# Patient Record
Sex: MALE | Race: WHITE | NOT HISPANIC OR LATINO | Employment: FULL TIME | URBAN - METROPOLITAN AREA
[De-identification: names, ages, dates, MRNs, and addresses within clinical notes are randomized per-mention and may not be internally consistent; named-entity substitution may affect disease eponyms.]

---

## 2019-02-18 ENCOUNTER — HOSPITAL ENCOUNTER (OUTPATIENT)
Dept: RADIOLOGY | Facility: HOSPITAL | Age: 19
Discharge: HOME | End: 2019-02-18
Attending: NURSE PRACTITIONER
Payer: OTHER GOVERNMENT

## 2019-02-18 ENCOUNTER — OFFICE VISIT (OUTPATIENT)
Dept: INTERNAL MEDICINE | Facility: CLINIC | Age: 19
End: 2019-02-18
Payer: OTHER GOVERNMENT

## 2019-02-18 VITALS
SYSTOLIC BLOOD PRESSURE: 118 MMHG | RESPIRATION RATE: 20 BRPM | BODY MASS INDEX: 27.75 KG/M2 | DIASTOLIC BLOOD PRESSURE: 80 MMHG | WEIGHT: 193.81 LBS | HEART RATE: 87 BPM | HEIGHT: 70 IN | OXYGEN SATURATION: 97 %

## 2019-02-18 DIAGNOSIS — M25.511 CHRONIC RIGHT SHOULDER PAIN: ICD-10-CM

## 2019-02-18 DIAGNOSIS — G89.29 CHRONIC RIGHT SHOULDER PAIN: ICD-10-CM

## 2019-02-18 DIAGNOSIS — G89.29 CHRONIC PAIN OF RIGHT KNEE: ICD-10-CM

## 2019-02-18 DIAGNOSIS — M25.561 CHRONIC PAIN OF RIGHT KNEE: ICD-10-CM

## 2019-02-18 DIAGNOSIS — M25.561 CHRONIC PAIN OF RIGHT KNEE: Primary | ICD-10-CM

## 2019-02-18 DIAGNOSIS — G89.29 CHRONIC PAIN OF RIGHT KNEE: Primary | ICD-10-CM

## 2019-02-18 PROCEDURE — 99999 PR PBB SHADOW E&M-NEW PATIENT-LVL IV: CPT | Mod: PBBFAC,,, | Performed by: NURSE PRACTITIONER

## 2019-02-18 PROCEDURE — 99204 OFFICE O/P NEW MOD 45 MIN: CPT | Mod: PBBFAC,PN,25 | Performed by: NURSE PRACTITIONER

## 2019-02-18 PROCEDURE — 73030 X-RAY EXAM OF SHOULDER: CPT | Mod: TC,RT

## 2019-02-18 PROCEDURE — 99204 OFFICE O/P NEW MOD 45 MIN: CPT | Mod: S$PBB,,, | Performed by: NURSE PRACTITIONER

## 2019-02-18 PROCEDURE — 73560 X-RAY EXAM OF KNEE 1 OR 2: CPT | Mod: TC,RT

## 2019-02-18 PROCEDURE — 99204 PR OFFICE/OUTPT VISIT, NEW, LEVL IV, 45-59 MIN: ICD-10-PCS | Mod: S$PBB,,, | Performed by: NURSE PRACTITIONER

## 2019-02-18 PROCEDURE — 99999 PR PBB SHADOW E&M-NEW PATIENT-LVL IV: ICD-10-PCS | Mod: PBBFAC,,, | Performed by: NURSE PRACTITIONER

## 2019-02-18 RX ORDER — IBUPROFEN 800 MG/1
800 TABLET ORAL 3 TIMES DAILY PRN
Qty: 90 TABLET | Refills: 0 | Status: SHIPPED | OUTPATIENT
Start: 2019-02-18 | End: 2021-04-14

## 2019-02-18 NOTE — PROGRESS NOTES
Subjective:       Patient ID: Hari Gillespie is a 18 y.o. male.    Chief Complaint: Establish Care; Knee Pain (both knees- x 1 year but getting worse in the last 2 weeks PS:8); and Shoulder Pain (R. shoulder - PS:2 more pain with movement and getting worse x over a year )    Patient is unknown, to me and presents with   Chief Complaint   Patient presents with    Establish Care    Knee Pain     both knees- x 1 year but getting worse in the last 2 weeks PS:8    Shoulder Pain     R. shoulder - PS:2 more pain with movement and getting worse x over a year    .  Denies chest pain and shortness of breath.  Patient presents with right knee pain that began about two years ago. Has been running and working out with pain. Pain is up to an 8 and also with shoulder. States that he works on [employer] and does a lot of heavy lifting  HPI  Review of Systems   Constitutional: Negative.  Negative for activity change, appetite change, chills, diaphoresis, fatigue, fever and unexpected weight change.   HENT: Negative.  Negative for congestion, ear discharge, ear pain, facial swelling, hearing loss, nosebleeds, postnasal drip, rhinorrhea, sinus pressure, sneezing, sore throat, tinnitus, trouble swallowing and voice change.    Eyes: Negative.  Negative for photophobia, pain, discharge, redness, itching and visual disturbance.   Respiratory: Negative.  Negative for apnea, cough, choking, chest tightness, shortness of breath, wheezing and stridor.    Cardiovascular: Negative.  Negative for chest pain, palpitations and leg swelling.   Gastrointestinal: Negative for abdominal distention, abdominal pain, anal bleeding, blood in stool, constipation, diarrhea, nausea and vomiting.   Genitourinary: Negative.  Negative for difficulty urinating, discharge, dysuria, enuresis, flank pain, frequency, hematuria, penile pain, penile swelling, scrotal swelling, testicular pain and urgency.   Musculoskeletal: Positive for arthralgias. Negative for  back pain, gait problem, joint swelling, myalgias, neck pain and neck stiffness.   Skin: Negative.  Negative for color change, pallor, rash and wound.   Neurological: Negative for dizziness, tremors, seizures, syncope, facial asymmetry, speech difficulty, weakness, light-headedness, numbness and headaches.   Hematological: Negative for adenopathy. Does not bruise/bleed easily.   Psychiatric/Behavioral: Negative.  Negative for agitation, dysphoric mood, sleep disturbance and suicidal ideas. The patient is not nervous/anxious.        Objective:      Physical Exam   Constitutional: He is oriented to person, place, and time. He appears well-developed and well-nourished. No distress.   HENT:   Head: Normocephalic and atraumatic.   Right Ear: External ear normal.   Left Ear: External ear normal.   Nose: Nose normal.   Mouth/Throat: Oropharynx is clear and moist. No oropharyngeal exudate.   Eyes: Conjunctivae and EOM are normal. Pupils are equal, round, and reactive to light. Right eye exhibits no discharge. Left eye exhibits no discharge.   Neck: Normal range of motion. Neck supple. No JVD present. No tracheal deviation present. No thyromegaly present.   Cardiovascular: Normal rate, regular rhythm, normal heart sounds and intact distal pulses. Exam reveals no gallop and no friction rub.   No murmur heard.  Pulmonary/Chest: Effort normal and breath sounds normal. No stridor. No respiratory distress. He has no wheezes. He has no rales. He exhibits no tenderness.   Abdominal: Soft. Bowel sounds are normal. He exhibits no distension. There is no tenderness. There is no rebound and no guarding.   Musculoskeletal: He exhibits no edema.        Right shoulder: He exhibits decreased range of motion, tenderness and crepitus.        Right knee: He exhibits decreased range of motion. Tenderness found.        Arms:       Legs:  Lymphadenopathy:     He has no cervical adenopathy.   Neurological: He is alert and oriented to person,  "place, and time. He has normal reflexes. He displays normal reflexes. No cranial nerve deficit. He exhibits normal muscle tone. Coordination normal.   Skin: Skin is warm and dry. Capillary refill takes less than 2 seconds. No rash noted. He is not diaphoretic. No erythema. No pallor.   Psychiatric: He has a normal mood and affect. His behavior is normal. Judgment and thought content normal.   Nursing note and vitals reviewed.      Assessment:       1. Chronic pain of right knee    2. Chronic right shoulder pain        Plan:   Hari was seen today for establish care, knee pain and shoulder pain.    Diagnoses and all orders for this visit:    Chronic pain of right knee  -     X-Ray Knee 1 or 2 View Right; Future  -     ibuprofen (ADVIL,MOTRIN) 800 MG tablet; Take 1 tablet (800 mg total) by mouth 3 (three) times daily as needed for Pain.    Chronic right shoulder pain  -     X-ray Shoulder 2 or More Views Right; Future  -     ibuprofen (ADVIL,MOTRIN) 800 MG tablet; Take 1 tablet (800 mg total) by mouth 3 (three) times daily as needed for Pain.    "This note will not be shared with the patient."  Pending reports may need ortho referral  Instructed on RICE  Will call with results  rtc as scheduled  "

## 2019-02-18 NOTE — PATIENT INSTRUCTIONS
Chronic Pain  Pain serves an important role. It lets you know something is wrong that needs your attention. When the body heals, pain normally goes away.  When pain lasts longer than six months, it is called chronic pain. This is pain that is present even after the body has healed. Chronic pain can cause mood problems and get in the way of your relationships and your daily life.  A number of conditions can cause chronic pain. Some of the more common include:  · Previous surgery  · An old injury  · Infection  · Diseases such as diabetes  · Nerve damage  · Back injury  · Arthritis  · Migraine or other headaches  · Fibromyalgia  · Cancer  Depression and stress can make chronic pain symptoms worse. In some cases, a cause for the pain cannot be found.   Treatment  Treatment can greatly reduce pain. In many cases, pain can become less severe, occur less often, and interfere less with your daily life. Chronic pain is often treated with a combination of medicines, therapies, and lifestyle changes. You will work closely with your healthcare provider to find a treatment plan that works best for you.  · Ask your healthcare provider for a referral to a pain management specialty center. These can provide the most recent and proven pain management strategies, along with emotional support and comprehensive services.  · Several different types of medicines may be prescribed for chronic pain. Work with your healthcare provider to develop a medicine plan that helps manage your pain.  · Physical therapy can be very effective in helping reduce certain types of chronic pain.  · Occupational therapy teaches you how to do routine tasks of daily living in ways that lessen your discomfort.  · Psychological therapy can help you cope better with stress and pain.  · Other therapies such as meditation, yoga, biofeedback, massage, and acupuncture can also help manage chronic pain.  · Changing certain habits can help reduce chronic pain. They  include:  ¨ Eating healthy  ¨ Developing an exercise routine  ¨ Getting enough sleep at night  ¨ Stopping smoking and limiting alcohol use  ¨ Losing excess weight  Follow-up care  Follow up with your healthcare provider as advised. Let your healthcare provider know if your current treatment plan is working or if changes are needed.  Resources  For more information, contact:  · American [place] for Headache Society www.achenet.org  · American Chronic Pain Association www.theacpa.org 138-836-9569  Date Last Reviewed: 7/26/2015 © 2000-2017 [employer]. [address] [place] [address]. All rights reserved. This information is not intended as a substitute for professional medical care. Always follow your healthcare professional's instructions.

## 2019-02-21 ENCOUNTER — TELEPHONE (OUTPATIENT)
Dept: INTERNAL MEDICINE | Facility: CLINIC | Age: 19
End: 2019-02-21

## 2019-02-21 DIAGNOSIS — M25.561 CHRONIC PAIN OF RIGHT KNEE: Primary | ICD-10-CM

## 2019-02-21 DIAGNOSIS — G89.29 CHRONIC PAIN OF RIGHT KNEE: Primary | ICD-10-CM

## 2019-03-15 ENCOUNTER — OFFICE VISIT (OUTPATIENT)
Dept: INTERNAL MEDICINE | Facility: CLINIC | Age: 19
End: 2019-03-15
Payer: OTHER GOVERNMENT

## 2019-03-15 VITALS
BODY MASS INDEX: 26.77 KG/M2 | HEART RATE: 86 BPM | HEIGHT: 70 IN | WEIGHT: 187 LBS | RESPIRATION RATE: 16 BRPM | OXYGEN SATURATION: 97 % | SYSTOLIC BLOOD PRESSURE: 108 MMHG | DIASTOLIC BLOOD PRESSURE: 70 MMHG

## 2019-03-15 DIAGNOSIS — M25.561 CHRONIC PAIN OF RIGHT KNEE: Primary | ICD-10-CM

## 2019-03-15 DIAGNOSIS — G89.29 CHRONIC PAIN OF RIGHT KNEE: Primary | ICD-10-CM

## 2019-03-15 PROCEDURE — 99213 OFFICE O/P EST LOW 20 MIN: CPT | Mod: S$PBB,,, | Performed by: NURSE PRACTITIONER

## 2019-03-15 PROCEDURE — 99999 PR PBB SHADOW E&M-EST. PATIENT-LVL IV: CPT | Mod: PBBFAC,,, | Performed by: NURSE PRACTITIONER

## 2019-03-15 PROCEDURE — 99999 PR PBB SHADOW E&M-EST. PATIENT-LVL IV: ICD-10-PCS | Mod: PBBFAC,,, | Performed by: NURSE PRACTITIONER

## 2019-03-15 PROCEDURE — 99214 OFFICE O/P EST MOD 30 MIN: CPT | Mod: PBBFAC,PN | Performed by: NURSE PRACTITIONER

## 2019-03-15 PROCEDURE — 99213 PR OFFICE/OUTPT VISIT, EST, LEVL III, 20-29 MIN: ICD-10-PCS | Mod: S$PBB,,, | Performed by: NURSE PRACTITIONER

## 2019-03-15 NOTE — PROGRESS NOTES
"Subjective:       Patient ID: Hari Gillespie is a 18 y.o. male.    Chief Complaint: Knee Pain (R. knee - been an ongoing issue getting worse PS:8)    Patient is known, to me and presents with   Chief Complaint   Patient presents with    Knee Pain     R. knee - been an ongoing issue getting worse PS:8   .  Denies chest pain and shortness of breath.  Patient presents with worsening of right leg knee pain that is not improving.  He reports that this is affecting his work at this time. Did not go to PT  HPI  Review of Systems   Constitutional: Negative.    Respiratory: Negative.    Cardiovascular: Negative.    Musculoskeletal: Positive for arthralgias. Negative for back pain, gait problem, joint swelling, myalgias, neck pain and neck stiffness.   Skin: Negative.        Objective:      Physical Exam   Constitutional: He appears well-developed and well-nourished. No distress.   Neck: Normal range of motion. Neck supple. No JVD present. No tracheal deviation present. No thyromegaly present.   Cardiovascular: Normal rate, regular rhythm and normal heart sounds.   No murmur heard.  Pulmonary/Chest: Effort normal and breath sounds normal. He has no wheezes.   Musculoskeletal: He exhibits tenderness. He exhibits no edema or deformity.        Right knee: He exhibits decreased range of motion. Tenderness found.        Legs:  Lymphadenopathy:     He has no cervical adenopathy.   Skin: Skin is warm and dry. Capillary refill takes less than 2 seconds. No rash noted. He is not diaphoretic. No erythema. No pallor.       Assessment:       1. Chronic pain of right knee        Plan:   Hari was seen today for knee pain.    Diagnoses and all orders for this visit:    Chronic pain of right knee  -     Ambulatory Referral to Orthopedics    "This note will not be shared with the patient."  Will send to ortho for further evaluation  Continue with [place]   rtc as scheduled    "

## 2019-03-15 NOTE — PATIENT INSTRUCTIONS
Chronic Pain  Pain serves an important role. It lets you know something is wrong that needs your attention. When the body heals, pain normally goes away.  When pain lasts longer than six months, it is called chronic pain. This is pain that is present even after the body has healed. Chronic pain can cause mood problems and get in the way of your relationships and your daily life.  A number of conditions can cause chronic pain. Some of the more common include:  · Previous surgery  · An old injury  · Infection  · Diseases such as diabetes  · Nerve damage  · Back injury  · Arthritis  · Migraine or other headaches  · Fibromyalgia  · Cancer  Depression and stress can make chronic pain symptoms worse. In some cases, a cause for the pain cannot be found.   Treatment  Treatment can greatly reduce pain. In many cases, pain can become less severe, occur less often, and interfere less with your daily life. Chronic pain is often treated with a combination of medicines, therapies, and lifestyle changes. You will work closely with your healthcare provider to find a treatment plan that works best for you.  · Ask your healthcare provider for a referral to a pain management specialty center. These can provide the most recent and proven pain management strategies, along with emotional support and comprehensive services.  · Several different types of medicines may be prescribed for chronic pain. Work with your healthcare provider to develop a medicine plan that helps manage your pain.  · Physical therapy can be very effective in helping reduce certain types of chronic pain.  · Occupational therapy teaches you how to do routine tasks of daily living in ways that lessen your discomfort.  · Psychological therapy can help you cope better with stress and pain.  · Other therapies such as meditation, yoga, biofeedback, massage, and acupuncture can also help manage chronic pain.  · Changing certain habits can help reduce chronic pain. They  include:  ¨ Eating healthy  ¨ Developing an exercise routine  ¨ Getting enough sleep at night  ¨ Stopping smoking and limiting alcohol use  ¨ Losing excess weight  Follow-up care  Follow up with your healthcare provider as advised. Let your healthcare provider know if your current treatment plan is working or if changes are needed.  Resources  For more information, contact:  · American [place] for Headache Society www.achenet.org  · American Chronic Pain Association www.theacpa.org 291-710-9193  Date Last Reviewed: 7/26/2015 © 2000-2017 [employer]. [address] [place] [address]. All rights reserved. This information is not intended as a substitute for professional medical care. Always follow your healthcare professional's instructions.        Knee Pain  Knee pain is very common. Its especially common in active people who put a lot of pressure on their knees, like runners. It affects women more often than men.  Your kneecap (patella) is a thick, round bone. It covers and protects the front portion of your knee joint. It moves along a groove in your thighbone (femur) as part of the patellofemoral joint. A layer of cartilage surrounds the underside of your kneecap. This layer protects it from grinding against your femur.  When this cartilage softens and breaks down, it can cause knee pain. This is partly because of repetitive stress. The stress irritates the lining of the joint. This causes pain in the underlying bone.  What causes knee pain?  Many things can cause knee pain. You may have more than one cause. Some of these include:  · Overuse of the knee joint  · The kneecap doesnt line up with the tissue around it  · Damage to small nerves in the area  · Damage to the ligament-like structure that holds the kneecap in place (retinaculum)  · Breakdown of the bone under the cartilage  · Swelling in the soft tissues around the kneecap  · Injury  You might be more likely to have knee pain if  you:  · Exercise a lot  · Recently increased the intensity of your workouts  · Have a body mass index (BMI) greater than 25  · Have poor alignment of your kneecap  · Walk with your feet turned overly outward or inward  · Have weakness in surrounding muscle groups (inner quad or hip adductor muscles)  · Have too much tightness in surrounding muscle groups (hamstrings or iliotibial band)  · Have a recent history of injury to the area  · Are female  Symptoms of knee pain  This type of knee pain is a dull, aching pain in the front of the knee in the area under and around the kneecap. This pain may start quickly or slowly. Your pain might be worse when you squat, run, or sit for a long time. You might also sometimes feel like your knee is giving out. You may have symptoms in one or both of your knees.  Diagnosing knee pain  Your healthcare provider will ask about your medical history and your symptoms. Be sure to describe any activities that make your knee pain worse. He or she will look at your knee. This will include tests of your range of motion, strength, and areas of pain of your knee. Your knee alignment will be checked.  Your healthcare provider will need to rule out other causes of your knee pain, such as arthritis. You may need an imaging test, such as an X-ray or MRI.  Treatment for knee pain  Treatments that can help ease your symptoms may include:  · Avoiding activities for a while that make your pain worse, returning to activity over time  · Icing the outside of your knee when it causes you pain  · Taking over-the-counter pain medicine  · Wearing a knee brace or taping your knee to support it  · Wearing special shoe inserts to help keep your feet in the proper alignment  · Doing special exercises to stretch and strengthen the muscles around your hip and your knee  These steps help most people manage knee pain. But some cases of knee pain need to be treated with surgery. You may need surgery right away. Or  you may need it later if other treatments dont work. Your healthcare provider may refer you to an orthopedic surgeon. He or she will talk with you about your choices.  Preventing knee pain  Losing weight and correcting excess muscle tightness or muscle weakness may help lower your risk.  In some cases, you can prevent knee pain. To help prevent a flare-up of knee pain, you do these things:  · Regularly do all the exercises your doctor or physical therapist advises  · Support your knee as advised by your doctor or physical therapist  · Increase training gradually, and ease up on training when needed  · Have an expert check your gait for running or other sporting activities  · Stretch properly before and after exercise  · Replace your running shoes regularly  · Lose excess weight     When to call your healthcare provider  Call your healthcare provider right away if:  · Your symptoms dont get better after a few weeks of treatment  · You have any new symptoms   Date Last Reviewed: 4/1/2017  © 7493-3229 [employer]. [address] [place] [address], [place] [address]. All rights reserved. This information is not intended as a substitute for professional medical care. Always follow your healthcare professional's instructions.

## 2019-04-02 ENCOUNTER — OFFICE VISIT (OUTPATIENT)
Dept: ORTHOPEDICS | Facility: CLINIC | Age: 19
End: 2019-04-02
Payer: OTHER GOVERNMENT

## 2019-04-02 VITALS — WEIGHT: 187 LBS | BODY MASS INDEX: 26.77 KG/M2 | HEIGHT: 70 IN

## 2019-04-02 DIAGNOSIS — M22.2X9 PATELLOFEMORAL STRESS SYNDROME, UNSPECIFIED LATERALITY: Primary | ICD-10-CM

## 2019-04-02 PROCEDURE — 99202 OFFICE O/P NEW SF 15 MIN: CPT | Mod: S$PBB,,, | Performed by: ORTHOPAEDIC SURGERY

## 2019-04-02 PROCEDURE — 99999 PR PBB SHADOW E&M-EST. PATIENT-LVL III: CPT | Mod: PBBFAC,,, | Performed by: ORTHOPAEDIC SURGERY

## 2019-04-02 PROCEDURE — 99999 PR PBB SHADOW E&M-EST. PATIENT-LVL III: ICD-10-PCS | Mod: PBBFAC,,, | Performed by: ORTHOPAEDIC SURGERY

## 2019-04-02 PROCEDURE — 99213 OFFICE O/P EST LOW 20 MIN: CPT | Mod: PBBFAC,PN | Performed by: ORTHOPAEDIC SURGERY

## 2019-04-02 PROCEDURE — 99202 PR OFFICE/OUTPT VISIT, NEW, LEVL II, 15-29 MIN: ICD-10-PCS | Mod: S$PBB,,, | Performed by: ORTHOPAEDIC SURGERY

## 2019-04-02 NOTE — LETTER
April 2, 2019      Crystal Atkinson, HEATHER  [address]  [place] [address]           [facility]  [address]  [address]  [facility] [address]  Phone: [phone]  Fax: [phone]          Patient: Hari Gillespie   MR Number: [id number]   YOB: 2000   Date of Visit: 4/2/2019       Dear Crystal Atkinson:    Thank you for referring Hari Gillespie to me for evaluation. Attached you will find relevant portions of my assessment and plan of care.    If you have questions, please do not hesitate to call me. I look forward to following Hari Gillespie along with you.    Sincerely,    Kyle Herrera MD    Enclosure  CC:  No Recipients    If you would like to receive this communication electronically, please contact [email or web address] or [phone] to request more information on [facility] Link access.    For providers and/or their staff who would like to refer a patient to [facility], please contact us through our one-stop-shop provider referral line, [facility], at [phone].    If you feel you have received this communication in error or would no longer like to receive these types of communications, please e-mail [email or web address]

## 2019-04-02 NOTE — PROGRESS NOTES
Subjective:      Patient ID: Hari Gillespie is a 18 y.o. male.    Chief Complaint: Pain of the Right Knee    HPI     They have experienced problems with their right knee over the past 6 weeks. The patient reports relevant history of injury/aggravation.Possibly from running. Pain is located diffusely Associated symptoms include NA.  Symptoms are aggravated by riding on [employer] boat. They have been treated with NSAIDS and bracing.   Symptoms have recently stayed the same. Ambulation reportedly has not been impaired. Self care ADLs are not painful.     Review of Systems   Constitution: Negative for fever and weight loss.   HENT: Negative for congestion.    Eyes: Negative for visual disturbance.   Cardiovascular: Negative for chest pain.   Respiratory: Negative for shortness of breath.    Hematologic/Lymphatic: Negative for bleeding problem. Does not bruise/bleed easily.   Skin: Negative for poor wound healing.   Musculoskeletal: Positive for joint pain.   Gastrointestinal: Negative for abdominal pain.   Genitourinary: Negative for dysuria.   Neurological: Negative for seizures.   Psychiatric/Behavioral: Negative for altered mental status.   Allergic/Immunologic: Negative for persistent infections.         Objective:            Ortho/SPM Exam    Right knee    The patient is not in acute distress.   Body habitus is normal.   No respiratory distress  Sclera appear normal  The patient walks without a limp.  Resisted SLR negative.   The skin over the knee is intact.  Knee effusion none   Tendernes is located absent  Range of motion- Full  Ligament exam:   MCL intact   Lachman intact              Post sag intact    LCL intact  Patellar apprehension negative.  Popliteal cyst negative  Patellar crepitation absent.  Flexion/pinch negative.  Pulses DP present, PT present.  Motor normal 5/5 strength in all tested muscle groups.   Sensory normal  Xray normal      Assessment:       Encounter Diagnosis   Name Primary?     Patellofemoral stress syndrome, unspecified laterality Yes          Plan:       Hari was seen today for pain.    Diagnoses and all orders for this visit:    Patellofemoral stress syndrome, unspecified laterality      I explained my impression in detail    Cont ibu    No running or squats    Light duty    PTx    Sports referral if not better

## 2019-04-16 ENCOUNTER — CLINICAL SUPPORT (OUTPATIENT)
Dept: REHABILITATION | Facility: HOSPITAL | Age: 19
End: 2019-04-16
Attending: ORTHOPAEDIC SURGERY
Payer: OTHER GOVERNMENT

## 2019-04-16 DIAGNOSIS — R26.2 DIFFICULTY WALKING: ICD-10-CM

## 2019-04-16 DIAGNOSIS — M22.2X1 PATELLOFEMORAL SYNDROME, RIGHT: Primary | ICD-10-CM

## 2019-04-16 DIAGNOSIS — S76.311S HAMSTRING STRAIN, RIGHT, SEQUELA: Chronic | ICD-10-CM

## 2019-04-16 DIAGNOSIS — R29.898 WEAKNESS OF EXTREMITY: ICD-10-CM

## 2019-04-16 PROCEDURE — 97110 THERAPEUTIC EXERCISES: CPT | Mod: PO

## 2019-04-16 PROCEDURE — 97162 PT EVAL MOD COMPLEX 30 MIN: CPT | Mod: PO

## 2019-04-16 NOTE — PLAN OF CARE
[facility]  Physical Therapy Initial Evaluation    Name: Hari Gillespie  Clinic Number: [id number]    Therapy Diagnosis:   Encounter Diagnoses   Name Primary?    Patellofemoral syndrome, right     Difficulty walking     Weakness of extremity      Physician: Kyle Herrera MD    Physician Orders: PT Eval and Treat   Medical Diagnosis from Referral: patellofemoral stress syndrome  Evaluation Date: 4/16/2019  Authorization Period Expiration: 8/8/19  Plan of Care Expiration: 7/16/19  Visit # / Visits authorized: 1/ 1    Time In: 11  Time Out: 12  Total Billable Time: 55 minutes    Precautions: Standard    Subjective   Date of onset: 2/2019  History of current condition - Hari reports: chronic hx of right knee pain with recent exacerbation while running in February. Pt. noted diffuse right knee pain. Pt. states that he had done some burpies, jumping jacks, and other calisthetics long-term through the run. The pain started immediately after the calisthetics. Pt. reports that he used to exercise too much: cross fit, swimming, etc. that first caused the right knee pain to start. Current symptoms: dull ache pretty-patella with occasional sharp pain (fleeting pain). Sometimes the sharp pain occurs with transitional activities I.e. movement from flexion to straightening.      Medical History:   No past medical history on file.    Surgical History:   Hari Gillespie  has no past surgical history on file.    Medications:   Hari has a current medication list which includes the following prescription(s): ibuprofen.    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; works 2 days on and 5 days off; duty swaps one day per  week  Prior Level of Function: run 2x/wk, 2x/wk: organized workout: run [employer] for 30 min., and then run back; basketball/football  Current Level of Function: work related activities only:  duties only    Pain:  Current 3/10, worst 8/10, best 1/10   Location: right knee   Description: Aching, Dull and Sharp  Aggravating Factors: Sitting, Standing, transitional activities; bending knee, Walking, stair negotiation: ascending/descending; jumping from boat to the pier and vice versa (2 ft), FWB on right knee; and work outs: physical activities  Easing Factors: ice, rest, elevation and brace/ibprofen    Pts goals: return to full job related activities    Objective     Observation: right foot eversion    Posture: kyphotic posturing    Gait: unremarkable     Active/Passive ROM: (measured in degrees)   Knee    Right WNL   Left WNL     Sensation: Intact bilateral LEs    Lower Extremity Strength (graded 0-5 out of 5)    Right LE Left LE   Hip flexion: 4+/5 5/5   Hip ER: 4+/5; medial posterior knee and VMO pain 5/5   Hip IR: 4/5 4/5   Knee extension:  4+/5 4+/5   Ankle dorsiflexion: 5/5 5/5   Posterior fibers of Gluteus Medius 4-/5 3-/5   Hip extension: 4+/5 4+/5   Knee flexion:  4+/5; medial hamstrings * 5/5   Glute max 4+/5 4+/5   Ankle plantarflexion: 5/5 5/5     Special Tests: (pos. or neg.)    (R)   Thessaly's Test medial hamstrings instability   Apley's Compression + IR of tibia has lateral knee pain   Apley's Distraction    -   Valgus/varus -   Valgus/varus at 30 deg. -   Bridge test + weakness of hip and core     Function:   Double Leg squat: quad. dominance bilaterally; genu valgum right greater than left; core weakness   SL squat: quad. dominance; right ankle EV, genu valgum bilaterally right greater than left; core weakness   SLS R: 20 sec.   SLS L: 45 sec.   Repeated heel raises: right: 20x with posterior knee/calf pain; left: 20x    Joint Mobility: (grading 0-6 out of 6)     RLE LLE    Superior glide of patella on femur 3- 3   Inferior patella glide 3- 3   Lateral patella glide 3- 3   Medial patella glide 3- 3     Palpation: TTP right pretty-patella and medial hamstrings (muscle belly and insertion)    Flexibility:   90/90 HS length: R = 50 degrees medial hamstrings straining pain; L = 60 degrees   Franko test: bilateral lateral retinaculum and rectus femoris    CMS Impairment/Limitation/Restriction for FOTO knee Survey    Therapist reviewed FOTO scores for Hari Gillespie on 4/16/2019.   FOTO documents entered into [employer] - see Media section.    Limitation Score: 57%       TREATMENT   Treatment Time In: 11:50  Treatment Time Out: 12  Total Treatment time separate from Evaluation: 10 minutes    Hari received therapeutic exercises to develop strength, ROM and flexibility for 10 minutes including:  foam roller quadriceps and ITB; prone quadriceps and long sit hamstrings stretch; and quad. set with towel under knee    Home Exercises and Patient Education Provided    Education provided:   - activity avoidance/modification, prognosis, diagnosis, and plan    Written Home Exercises Provided: Patient instructed to cont prior HEP.  Exercises were reviewed and Hari was able to demonstrate them prior to the end of the session.  Hari demonstrated good  understanding of the education provided.     See EMR under Patient Instructions for exercises provided 4/16/2019.    Assessment   Hari is a 18 y.o. male referred to outpatient Physical Therapy with a medical diagnosis of patellofemoral stress syndrome. Pt presents with gluteus/fernandez/minimus weakness, decreased hamstrings length, myofascial restriction right medial hamstring, and    Pt prognosis is Excellent.   Pt will benefit from skilled outpatient Physical Therapy to address the deficits stated above and in the chart below, provide pt/family education, and to maximize pt's level of independence.     Plan of care discussed with patient: Yes  Pt's spiritual,  cultural and educational needs considered and patient is agreeable to the plan of care and goals as stated below:     Anticipated Barriers for therapy: chronicity of condition    Medical Necessity is demonstrated by the following  History  Co-morbidities and personal factors that may impact the plan of care Co-morbidities:   none    Personal Factors:   chronicity of condition     moderate   Examination  Body Structures and Functions, activity limitations and participation restrictions that may impact the plan of care Body Regions:   lower extremities    Body Systems:    gross symmetry  ROM  strength  balance  gait  transfers  transitions    Participation Restrictions:   leisure workout and calisthenics for exercise endurance with job related activities     Activity limitations:   Learning and applying knowledge  no deficits    General Tasks and Commands  no deficits    Communication  no deficits    Mobility  walking    Self care  no deficits    Domestic Life  no deficits    Interactions/Relationships  no deficits    Life Areas  no deficits    Community and Social Life  no deficits         moderate   Clinical Presentation evolving clinical presentation with changing clinical characteristics moderate   Decision Making/ Complexity Score: moderate     GOALS:   Short Term Goals:  6 weeks  1. Pt. to report decreased right knee pain  </= 5/10 at worst to increase tolerance for transition activities  2. Pt. to demonstrate increased MMT for left gluteus medius to 3/5 to increase hip stability during ambulation on uneven ground.  3. Pt to report independence with symptom management  4. Pt to tolerate HEP to improve ROM and independence with ADL's    Long Term Goals: 12 weeks  1. Pt. to report decreased right knee pain  </= 2/10 at worst to increase tolerance for prolonged positioning/job related activities  2. Pt. to be able to perform required work calisthenics with minimal to no c/o increased pain.  3. Pt. to demonstrate  increased MMT for bilateral gluteus medius/fernandez/medius to 4+/5 to increase hip stability during ambulation/running.  4. Pt. to demonstrate increased MMT for bilateral hamstrings to 5/5 to increase ease with knee flexion/toe clearance during ambulation/running  5. Pt will report at CJ level (20-40% impaired) on LE FOTO survey to demonstrate increase in LE function and mobility in home and community environment.   6. Pt to be independent with HEP to improve ROM and independence with ADL's    Plan   Plan of care Certification: 4/16/2019 to 7/16/19.    Outpatient Physical Therapy 2 times weekly for 10 weeks to include the following interventions: Manual Therapy, Neuromuscular Re-ed, Patient Education and Therapeutic Exercise.     Sun Navarro, PT

## 2019-04-24 ENCOUNTER — CLINICAL SUPPORT (OUTPATIENT)
Dept: REHABILITATION | Facility: HOSPITAL | Age: 19
End: 2019-04-24
Attending: ORTHOPAEDIC SURGERY
Payer: OTHER GOVERNMENT

## 2019-04-24 DIAGNOSIS — R29.898 WEAKNESS OF EXTREMITY: ICD-10-CM

## 2019-04-24 DIAGNOSIS — M22.2X1 PATELLOFEMORAL SYNDROME, RIGHT: Primary | ICD-10-CM

## 2019-04-24 DIAGNOSIS — S76.311S HAMSTRING STRAIN, RIGHT, SEQUELA: ICD-10-CM

## 2019-04-24 DIAGNOSIS — R26.2 DIFFICULTY WALKING: ICD-10-CM

## 2019-04-24 PROCEDURE — 97110 THERAPEUTIC EXERCISES: CPT | Mod: PO

## 2019-04-24 NOTE — PROGRESS NOTES
"  Physical Therapy Daily Treatment Note     Name: Hari Gillespie  Clinic Number: [id number]    Therapy Diagnosis:   Encounter Diagnoses   Name Primary?    Patellofemoral syndrome, right Yes    Weakness of extremity     Difficulty walking     Hamstring strain, right, sequela      Physician: Kyle Herrera MD    Visit Date: 4/24/2019    Physician Orders: PT Eval and Treat   Medical Diagnosis from Referral: patellofemoral stress syndrome  Evaluation Date: 4/16/2019  Authorization Period Expiration: 8/8/19  Plan of Care Expiration: 7/16/19  Visit # / Visits authorized: 2/ 16    Time In: 3pm  Time Out: 5pm  Total Billable Time: 30 minutes    Precautions: Standard    Subjective     Pt reports: he is doing pretty well. Notes soreness with HEP, but a "good" soreness.  He was compliant with home exercise program.  Response to previous treatment: some reduction in pain  Functional change: none as of yet    Pain: 2/10  Location: right knee: ITB and hamstrings    Objective     Hari received therapeutic exercises to develop strength, endurance, ROM, flexibility, posture and core stabilization for 30 minutes including:  Warm-up  · upright bike: L1 5'  Prone:  Supine:   · Long sit TKE stretch with belt: 3x30 sec.  · HS runner's stretch: 3x30 sec.  · QS with heel prop: 10x10 sec.   · bridge with isometric hip abduction with OTB: 2x10  Sidelying:   · s/l clams with 1# at knee: 2x10  Sitting:    Standing:  · soleus stretch in lunge position: 3x30 sec.  · SLS: 3x30 sec.   Machines  · shuttle double leg squat: 2 black bands: 2x8  · shuttle up on two down on one with 2 black bands: 2x8  · sidelying single squat: 1.5 bands: 2x8 bilaterally    Home Exercises Provided and Patient Education Provided     Education provided:   - activity modification/avoidance, symptom management    Written Home Exercises Provided: Patient instructed to cont prior HEP.  Exercises were reviewed and Hari was able to demonstrate them prior to the end of " the session.  Hari demonstrated good  understanding of the education provided.     See EMR under Patient Instructions for exercises provided 4/24/2019.    Assessment     Pt. reported soreness with manual therapy and stretching, but denied lingering pain.   Hari is progressing well towards his goals.   Pt prognosis is Excellent.     Pt will continue to benefit from skilled outpatient physical therapy to address the deficits listed in the problem list box on initial evaluation, provide pt/family education and to maximize pt's level of independence in the home and community environment.     Pt's spiritual, cultural and educational needs considered and pt agreeable to plan of care and goals.     Anticipated barriers to physical therapy: chronicity of condition, lumbar spine PMH    GOALS:   Short Term Goals:  6 weeks  1. Pt. to report decreased right knee pain  </= 5/10 at worst to increase tolerance for transition activities  2. Pt. to demonstrate increased MMT for left gluteus medius to 3/5 to increase hip stability during ambulation on uneven ground.  3. Pt to report independence with symptom management  4. Pt to tolerate HEP to improve ROM and independence with ADL's     Long Term Goals: 12 weeks  1. Pt. to report decreased right knee pain  </= 2/10 at worst to increase tolerance for prolonged positioning/job related activities  2. Pt. to be able to perform required work calisthenics with minimal to no c/o increased pain.  3. Pt. to demonstrate increased MMT for bilateral gluteus medius/fernandez/medius to 4+/5 to increase hip stability during ambulation/running.  4. Pt. to demonstrate increased MMT for bilateral hamstrings to 5/5 to increase ease with knee flexion/toe clearance during ambulation/running  5. Pt will report at CJ level (20-40% impaired) on LE FOTO survey to demonstrate increase in LE function and mobility in home and community environment.   6. Pt to be independent with HEP to improve ROM and  independence with ADL's    Plan     Continue per POC    Sun Navarro, PT

## 2019-05-13 ENCOUNTER — CLINICAL SUPPORT (OUTPATIENT)
Dept: REHABILITATION | Facility: HOSPITAL | Age: 19
End: 2019-05-13
Attending: ORTHOPAEDIC SURGERY
Payer: OTHER GOVERNMENT

## 2019-05-13 DIAGNOSIS — M22.2X1 PATELLOFEMORAL SYNDROME, RIGHT: Primary | ICD-10-CM

## 2019-05-13 DIAGNOSIS — R29.898 WEAKNESS OF EXTREMITY: ICD-10-CM

## 2019-05-13 DIAGNOSIS — R26.2 DIFFICULTY WALKING: ICD-10-CM

## 2019-05-13 DIAGNOSIS — S76.311S HAMSTRING STRAIN, RIGHT, SEQUELA: ICD-10-CM

## 2019-05-13 PROCEDURE — 97110 THERAPEUTIC EXERCISES: CPT | Mod: PO

## 2019-05-13 NOTE — PROGRESS NOTES
Physical Therapy Daily Treatment Note     Name: Hari Gillespie  Clinic Number: [id number]    Therapy Diagnosis:   Encounter Diagnoses   Name Primary?    Patellofemoral syndrome, right Yes    Weakness of extremity     Difficulty walking     Hamstring strain, right, sequela      Physician: Kyle Herrera MD    Visit Date: 5/13/2019    Physician Orders: PT Eval and Treat   Medical Diagnosis from Referral: patellofemoral stress syndrome  Evaluation Date: 4/16/2019  Authorization Period Expiration: 8/8/19  Plan of Care Expiration: 7/16/19  Visit # / Visits authorized: 2/ 16    Time In: 11 am  Time Out: 12 pm  Total Billable Time: 30 minutes    Precautions: Standard    Subjective     Pt reports: he is doing pretty well notes that he has been feeling better since performing the exercises  He was compliant with home exercise program.  Response to previous treatment: less pain since last tx session  Functional change:  less pain with walking/stair negotiation    Pain: 2/10  Location: right knee: ITB and hamstrings    Objective     Hari received therapeutic exercises to develop strength, endurance, ROM, flexibility, posture and core stabilization for 30 minutes including:  Warm-up  · upright bike: L1 10'  Prone:  Supine:   · right Long sit TKE stretch with belt: 3x30 sec.  · right HS runner's stretch: 3x30 sec.  · QS with heel prop: 10x10 sec.   · bridge with isometric hip abduction with OTB: 2x10  Sidelying:   · s/l clams with 1# at knee: 2x10  Sitting:    Standing:  · soleus stretch in lunge position: 3x30 sec.  · SLS on airex: 3x30 sec.   Machines  · shuttle double leg squat with GTB at knees: 2 black bands: 2x8  · shuttle up on two down on one with 2 black bands with GTB at knees: 2x8  · sidelying single squat with GTB at knees: 1.5 bands: 2x8 bilaterally    Home Exercises Provided and Patient Education Provided     Education provided:   - activity modification/avoidance, symptom management    Written Home  Exercises Provided: Patient instructed to cont prior HEP.  Exercises were reviewed and Hari was able to demonstrate them prior to the end of the session.  Hari demonstrated good  understanding of the education provided.     See EMR under Patient Instructions for exercises provided 4/24/2019.    Assessment     Pt. had really good tolerance to manual therapy and exercise progression denying increased pain after treatment.   Hari is progressing well towards his goals.   Pt prognosis is Excellent.     Pt will continue to benefit from skilled outpatient physical therapy to address the deficits listed in the problem list box on initial evaluation, provide pt/family education and to maximize pt's level of independence in the home and community environment.     Pt's spiritual, cultural and educational needs considered and pt agreeable to plan of care and goals.     Anticipated barriers to physical therapy: chronicity of condition, lumbar spine PMH    GOALS:   Short Term Goals:  6 weeks  1. Pt. to report decreased right knee pain  </= 5/10 at worst to increase tolerance for transition activities  2. Pt. to demonstrate increased MMT for left gluteus medius to 3/5 to increase hip stability during ambulation on uneven ground.  3. Pt to report independence with symptom management  4. Pt to tolerate HEP to improve ROM and independence with ADL's     Long Term Goals: 12 weeks  1. Pt. to report decreased right knee pain  </= 2/10 at worst to increase tolerance for prolonged positioning/job related activities  2. Pt. to be able to perform required work calisthenics with minimal to no c/o increased pain.  3. Pt. to demonstrate increased MMT for bilateral gluteus medius/fernandez/medius to 4+/5 to increase hip stability during ambulation/running.  4. Pt. to demonstrate increased MMT for bilateral hamstrings to 5/5 to increase ease with knee flexion/toe clearance during ambulation/running  5. Pt will report at CJ level (20-40% impaired)  on LE FOTO survey to demonstrate increase in LE function and mobility in home and community environment.   6. Pt to be independent with HEP to improve ROM and independence with ADL's    Plan     Continue per POC    Sun Navarro, PT

## 2019-05-17 ENCOUNTER — CLINICAL SUPPORT (OUTPATIENT)
Dept: REHABILITATION | Facility: HOSPITAL | Age: 19
End: 2019-05-17
Attending: ORTHOPAEDIC SURGERY
Payer: OTHER GOVERNMENT

## 2019-05-17 DIAGNOSIS — S76.311S HAMSTRING STRAIN, RIGHT, SEQUELA: ICD-10-CM

## 2019-05-17 DIAGNOSIS — R29.898 WEAKNESS OF EXTREMITY: ICD-10-CM

## 2019-05-17 DIAGNOSIS — M22.2X1 PATELLOFEMORAL SYNDROME, RIGHT: Primary | ICD-10-CM

## 2019-05-17 DIAGNOSIS — R26.2 DIFFICULTY WALKING: ICD-10-CM

## 2019-05-17 PROCEDURE — 97140 MANUAL THERAPY 1/> REGIONS: CPT | Mod: PO

## 2019-05-17 PROCEDURE — 97110 THERAPEUTIC EXERCISES: CPT | Mod: PO

## 2019-05-17 NOTE — PROGRESS NOTES
"  Physical Therapy Daily Treatment Note     Name: Hari Gillespie  Clinic Number: [id number]    Therapy Diagnosis:   Encounter Diagnoses   Name Primary?    Patellofemoral syndrome, right Yes    Weakness of extremity     Difficulty walking     Hamstring strain, right, sequela      Physician: Kyle Herrera MD    Visit Date: 5/17/2019    Physician Orders: PT Eval and Treat   Medical Diagnosis from Referral: patellofemoral stress syndrome  Evaluation Date: 4/16/2019  Authorization Period Expiration: 8/8/19  Plan of Care Expiration: 7/16/19  Visit # / Visits authorized: 2/ 16    Time In: 11 am  Time Out: 12 pm  Total Billable Time: 30 minutes    Precautions: Standard    Subjective     Pt reports: he is still pretty sore as he has been working a lot lately and notes that he has knee pain.   He was compliant with home exercise program.  Response to previous treatment: less pain since last tx session  Functional change:  no change with more aggressive activity    Pain: 2/10  Location: right knee: ITB and hamstrings    Objective     Hari received therapeutic exercises to develop strength, endurance, ROM, flexibility, posture and core stabilization for 40 minutes including:  Warm-up  · upright bike: L1 10'  Prone:  Supine:   · right Long sit TKE stretch with belt: 3x30 sec.  · right HS runner's stretch: 3x30 sec.  · QS with heel prop: 10x10 sec. transition to QS to SLR  · bridge with isometric hip abduction with OTB: 2x10  Sidelying:   · s/l clams with 1# at knee: 2x10  Sitting:    Standing:  · soleus stretch in lunge position: 3x30 sec.  · SLS on airex: 3x30 sec.   · 8" forward/lateral step up: 2x8 with verbal cues for focus on eccentric control  Machines  · shuttle double leg squat with GTB at knees: 2 black bands: 2x8  · shuttle up on two down on one with 2 black bands with GTB at knees: 2x8  · sidelying single squat with GTB at knees: 1.5 bands: 2x8 bilaterally    Manual therapy: Hari  received the following " manual therapy techniques x 10 min. to include soft tissue and joint mobilizations were applied to the: right knee To include:   Soft tissue mobilization  · Pt received tool-assisted massage to right hamstrings and quadriceps x 8 minutes to trigger an inflammatory healing response and stimulate the production of new collagen and proper, more functional, less painful healing.    Home Exercises Provided and Patient Education Provided     Education provided:   - activity modification/avoidance, symptom management    Written Home Exercises Provided: Patient instructed to cont prior HEP.  Exercises were reviewed and Hari was able to demonstrate them prior to the end of the session.  Hari demonstrated good  understanding of the education provided.     See EMR under Patient Instructions for exercises provided 4/24/2019.    Assessment   Pt. had good tolerance to manual therapy and exercise progression denying increased pain. Pt. continues to benefit from greater quadriceps and hamstrings soft tissue extensibility.     Hari is progressing well towards his goals.   Pt prognosis is Excellent.     Pt will continue to benefit from skilled outpatient physical therapy to address the deficits listed in the problem list box on initial evaluation, provide pt/family education and to maximize pt's level of independence in the home and community environment.     Pt's spiritual, cultural and educational needs considered and pt agreeable to plan of care and goals.     Anticipated barriers to physical therapy: chronicity of condition, lumbar spine PMH    GOALS:   Short Term Goals:  6 weeks progressing 5/17/2019  1. Pt. to report decreased right knee pain  </= 5/10 at worst to increase tolerance for transition activities  2. Pt. to demonstrate increased MMT for left gluteus medius to 3/5 to increase hip stability during ambulation on uneven ground.  3. Pt to report independence with symptom management  4. Pt to tolerate HEP to improve ROM  and independence with ADL's     Long Term Goals: 12 weeks progressing 5/17/2019  1. Pt. to report decreased right knee pain  </= 2/10 at worst to increase tolerance for prolonged positioning/job related activities  2. Pt. to be able to perform required work calisthenics with minimal to no c/o increased pain.  3. Pt. to demonstrate increased MMT for bilateral gluteus medius/fernandez/medius to 4+/5 to increase hip stability during ambulation/running.  4. Pt. to demonstrate increased MMT for bilateral hamstrings to 5/5 to increase ease with knee flexion/toe clearance during ambulation/running  5. Pt will report at CJ level (20-40% impaired) on LE FOTO survey to demonstrate increase in LE function and mobility in home and community environment.   6. Pt to be independent with HEP to improve ROM and independence with ADL's    Plan     Continue per POC    Sun Navarro, PT

## 2019-05-27 ENCOUNTER — CLINICAL SUPPORT (OUTPATIENT)
Dept: REHABILITATION | Facility: HOSPITAL | Age: 19
End: 2019-05-27
Attending: ORTHOPAEDIC SURGERY
Payer: OTHER GOVERNMENT

## 2019-05-27 DIAGNOSIS — R29.898 WEAKNESS OF EXTREMITY: ICD-10-CM

## 2019-05-27 DIAGNOSIS — S76.311S HAMSTRING STRAIN, RIGHT, SEQUELA: ICD-10-CM

## 2019-05-27 DIAGNOSIS — R26.2 DIFFICULTY WALKING: ICD-10-CM

## 2019-05-27 DIAGNOSIS — M22.2X1 PATELLOFEMORAL SYNDROME, RIGHT: Primary | ICD-10-CM

## 2019-05-27 PROCEDURE — 97140 MANUAL THERAPY 1/> REGIONS: CPT | Mod: PO

## 2019-05-27 PROCEDURE — 97110 THERAPEUTIC EXERCISES: CPT | Mod: PO

## 2019-05-27 NOTE — PROGRESS NOTES
"  Physical Therapy Daily Treatment Note     Name: Hari Gillespie  Clinic Number: [id number]    Therapy Diagnosis:   Encounter Diagnoses   Name Primary?    Patellofemoral syndrome, right Yes    Weakness of extremity     Difficulty walking     Hamstring strain, right, sequela      Physician: Kyle Herrera MD    Visit Date: 5/27/2019    Physician Orders: PT Eval and Treat   Medical Diagnosis from Referral: patellofemoral stress syndrome  Evaluation Date: 4/16/2019  Authorization Period Expiration: 8/8/19  Plan of Care Expiration: 7/16/19  Visit # / Visits authorized: 4/ 16    Time In: 11 am  Time Out: 12 pm  Total Billable Time: 45 minutes    Precautions: Standard    Subjective     Pt reports: some soreness after the tool work at last session but overall it is doing a whole lot better.  He was compliant with home exercise program.  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  · right HS runner's stretch: 3x30 sec.  · QS with heel prop: 10x10 sec. transition to QS to SLR x 10  · bridge with isometric hip abduction with GTB: 2x10  Sidelying:   · s/l clams with 2# at knee: 2x10  Sitting:    Standing:  · soleus stretch in lunge position: 3x30 sec.  · SLS on airex: 3x30 sec.   · 8" forward/lateral step up: 2x8 with verbal cues for focus on eccentric control  Machines  · shuttle double leg squat with GTB at knees: 2 black bands: 2x10  · shuttle up on two down on one with 2 black bands with GTB at knees: 2x10  · sidelying single squat with GTB at knees: 1.5 bands: 2x10 bilaterally    Manual therapy: Hari  received " the following manual therapy techniques x 10 min. to include soft tissue and joint mobilizations were applied to the: right knee To include:   Soft tissue mobilization  · Pt received tool-assisted massage to right hamstrings and quadriceps x 8 minutes to trigger an inflammatory healing response and stimulate the production of new collagen and proper, more functional, less painful healing.    Home Exercises Provided and Patient Education Provided     Education provided:   - activity modification/avoidance, symptom management    Written Home Exercises Provided: Patient instructed to cont prior HEP.  Exercises were reviewed and Hari was able to demonstrate them prior to the end of the session.  Hari demonstrated good  understanding of the education provided.     See EMR under Patient Instructions for exercises provided 4/24/2019.    Assessment   Good tolerance to tx session today with overall reports of decreased pain and increased function since initiating therapy. Pt was able to make small advancements in repetitions and theraband resistance today without adverse effects. Manual therapy techniques continued today as pt is experiencing positive effects from tx.    Hari is progressing well towards his goals.   Pt prognosis is Excellent.     Pt will continue to benefit from skilled outpatient physical therapy to address the deficits listed in the problem list box on initial evaluation, provide pt/family education and to maximize pt's level of independence in the home and community environment.     Pt's spiritual, cultural and educational needs considered and pt agreeable to plan of care and goals.     Anticipated barriers to physical therapy: chronicity of condition, lumbar spine PMH    GOALS:   Short Term Goals:  6 weeks progressing 5/27/2019  1. Pt. to report decreased right knee pain  </= 5/10 at worst to increase tolerance for transition activities  2. Pt. to demonstrate increased MMT for left gluteus medius to 3/5  to increase hip stability during ambulation on uneven ground.  3. Pt to report independence with symptom management  4. Pt to tolerate HEP to improve ROM and independence with ADL's     Long Term Goals: 12 weeks progressing 5/27/2019  1. Pt. to report decreased right knee pain  </= 2/10 at worst to increase tolerance for prolonged positioning/job related activities  2. Pt. to be able to perform required work calisthenics with minimal to no c/o increased pain.  3. Pt. to demonstrate increased MMT for bilateral gluteus medius/fernandez/medius to 4+/5 to increase hip stability during ambulation/running.  4. Pt. to demonstrate increased MMT for bilateral hamstrings to 5/5 to increase ease with knee flexion/toe clearance during ambulation/running  5. Pt will report at CJ level (20-40% impaired) on LE FOTO survey to demonstrate increase in LE function and mobility in home and community environment.   6. Pt to be independent with HEP to improve ROM and independence with ADL's    Plan     Continue per POC Ashley Holstein, PT

## 2019-06-04 ENCOUNTER — OFFICE VISIT (OUTPATIENT)
Dept: ORTHOPEDICS | Facility: CLINIC | Age: 19
End: 2019-06-04
Payer: OTHER GOVERNMENT

## 2019-06-04 VITALS — BODY MASS INDEX: 26.77 KG/M2 | WEIGHT: 187 LBS | HEIGHT: 70 IN

## 2019-06-04 DIAGNOSIS — M22.2X9 PATELLOFEMORAL STRESS SYNDROME, UNSPECIFIED LATERALITY: Primary | ICD-10-CM

## 2019-06-04 PROCEDURE — 99212 PR OFFICE/OUTPT VISIT, EST, LEVL II, 10-19 MIN: ICD-10-PCS | Mod: S$PBB,,, | Performed by: ORTHOPAEDIC SURGERY

## 2019-06-04 PROCEDURE — 99212 OFFICE O/P EST SF 10 MIN: CPT | Mod: S$PBB,,, | Performed by: ORTHOPAEDIC SURGERY

## 2019-06-04 PROCEDURE — 99999 PR PBB SHADOW E&M-EST. PATIENT-LVL II: ICD-10-PCS | Mod: PBBFAC,,, | Performed by: ORTHOPAEDIC SURGERY

## 2019-06-04 PROCEDURE — 99999 PR PBB SHADOW E&M-EST. PATIENT-LVL II: CPT | Mod: PBBFAC,,, | Performed by: ORTHOPAEDIC SURGERY

## 2019-06-04 PROCEDURE — 99212 OFFICE O/P EST SF 10 MIN: CPT | Mod: PBBFAC,PN | Performed by: ORTHOPAEDIC SURGERY

## 2019-06-04 NOTE — PROGRESS NOTES
Subjective:      Patient ID: Hari Gillespie is a 18 y.o. male.    Chief Complaint: Pain of the Right Knee    HPI  Follow-up for patellofemoral pain secondary to running, exercise and job-related activity.  The patient has completed therapy and feels dramatically better.  Denies any significant symptoms at this point.  Review of Systems   Constitution: Negative for fever and weight loss.   HENT: Negative for congestion.    Eyes: Negative for visual disturbance.   Cardiovascular: Negative for chest pain.   Respiratory: Negative for shortness of breath.    Hematologic/Lymphatic: Negative for bleeding problem. Does not bruise/bleed easily.   Skin: Negative for poor wound healing.   Gastrointestinal: Negative for abdominal pain.   Genitourinary: Negative for dysuria.   Neurological: Negative for seizures.   Psychiatric/Behavioral: Negative for altered mental status.   Allergic/Immunologic: Negative for persistent infections.         Objective:            Ortho/SPM Exam  Right knee    The patient is not in acute distress.   Body habitus is normal.   Sclera appear normal  No respiratory distress  The patient walks without a limp.  Resisted SLR negative.   The skin over the knee is intact.  Knee effusion 0  Tendernes is located absent.  Range of motion- Flexion full, Extension full.   Ligament exam:   MCL intact   Lachman intact              Post sag intact    LCL intact  Patellar apprehension negative.  Popliteal cyst negative  Patellar crepitation absent.  Flexion/pinch negative.  Pulses DP present, PT present.  Motor normal 5/5 strength in all tested muscle groups.   Sensory normal.            Assessment:       No diagnosis found.       The condition is controlled with the current treatment regimen  Plan:       There are no diagnoses linked to this encounter.    I explained the natural history of the patient's condition.    Appropriate long-term activity modification and exercise discussed.

## 2019-07-10 ENCOUNTER — DOCUMENTATION ONLY (OUTPATIENT)
Dept: REHABILITATION | Facility: HOSPITAL | Age: 19
End: 2019-07-10

## 2019-07-10 DIAGNOSIS — S76.311S HAMSTRING STRAIN, RIGHT, SEQUELA: ICD-10-CM

## 2019-07-10 DIAGNOSIS — M22.2X1 PATELLOFEMORAL SYNDROME, RIGHT: Primary | ICD-10-CM

## 2019-07-10 DIAGNOSIS — R26.2 DIFFICULTY WALKING: ICD-10-CM

## 2019-07-10 DIAGNOSIS — R29.898 WEAKNESS OF EXTREMITY: ICD-10-CM

## 2019-07-10 NOTE — DISCHARGE SUMMARY
PHYSICAL THERAPY DISCHARGE SUMMARY     Name: Hari Gillespie  Clinic Number: [id number]    Diagnosis:   Encounter Diagnoses   Name Primary?    Patellofemoral syndrome, right Yes    Weakness of extremity     Difficulty walking     Hamstring strain, right, sequela      Physician: Kyle Herrera MD  Treatment Orders: Therapeutic exercise  No past medical history on file.    Physician Orders: PT Eval and Treat   Medical Diagnosis from Referral: patellofemoral stress syndrome    Initial visit: 4/16/2019  Date of Last visit: 5/27/19  Date of Discharge Note:  7/10/19  Total Visits Received: 5  Missed Visits: 6  ASSESSMENT   Status Towards Goals Met:  Not met due to non-compliance    Goals Not achieved and why: see above    Discharge reason : Pt has not re-scheduled further follow-up sessions    PLAN   This patient is discharged from Physical Therapy Services.       GOALS: Goals not met due to non-compliance  Short Term Goals:  6 weeks  1. Pt. to report decreased right knee pain  </= 5/10 at worst to increase tolerance for transition activities MET  2. Pt. to demonstrate increased MMT for left gluteus medius to 3/5 to increase hip stability during ambulation on uneven ground.  3. Pt to report independence with symptom management  4. Pt to tolerate HEP to improve ROM and independence with ADL's     Long Term Goals: 12 weeks  1. Pt. to report decreased right knee pain  </= 2/10 at worst to increase tolerance for prolonged positioning/job related activities  2. Pt. to be able to perform required work calisthenics with minimal to no c/o increased pain.  3. Pt. to demonstrate increased MMT for bilateral gluteus medius/fernandez/medius to 4+/5 to increase hip stability during ambulation/running.  4. Pt. to demonstrate increased MMT for bilateral hamstrings to 5/5 to increase ease with knee flexion/toe clearance during ambulation/running  5. Pt will report at CJ level (20-40% impaired) on LE FOTO survey to demonstrate increase  in LE function and mobility in home and community environment.   6. Pt to be independent with HEP to improve ROM and independence with ADL's

## 2020-08-31 ENCOUNTER — OFFICE VISIT (OUTPATIENT)
Dept: INTERNAL MEDICINE | Facility: CLINIC | Age: 20
End: 2020-08-31
Payer: OTHER GOVERNMENT

## 2020-08-31 VITALS
RESPIRATION RATE: 18 BRPM | HEART RATE: 75 BPM | BODY MASS INDEX: 30.37 KG/M2 | TEMPERATURE: 98 F | WEIGHT: 216.94 LBS | DIASTOLIC BLOOD PRESSURE: 86 MMHG | HEIGHT: 71 IN | OXYGEN SATURATION: 98 % | SYSTOLIC BLOOD PRESSURE: 134 MMHG

## 2020-08-31 DIAGNOSIS — B96.89 ACUTE BACTERIAL SINUSITIS: Primary | ICD-10-CM

## 2020-08-31 DIAGNOSIS — J01.90 ACUTE BACTERIAL SINUSITIS: Primary | ICD-10-CM

## 2020-08-31 PROCEDURE — 99213 OFFICE O/P EST LOW 20 MIN: CPT | Mod: S$PBB,,, | Performed by: NURSE PRACTITIONER

## 2020-08-31 PROCEDURE — 96372 THER/PROPH/DIAG INJ SC/IM: CPT | Mod: PBBFAC,PN

## 2020-08-31 PROCEDURE — 99999 PR PBB SHADOW E&M-EST. PATIENT-LVL III: CPT | Mod: PBBFAC,,, | Performed by: NURSE PRACTITIONER

## 2020-08-31 PROCEDURE — 99213 OFFICE O/P EST LOW 20 MIN: CPT | Mod: PBBFAC,PN | Performed by: NURSE PRACTITIONER

## 2020-08-31 PROCEDURE — 99213 PR OFFICE/OUTPT VISIT, EST, LEVL III, 20-29 MIN: ICD-10-PCS | Mod: S$PBB,,, | Performed by: NURSE PRACTITIONER

## 2020-08-31 PROCEDURE — 99999 PR PBB SHADOW E&M-EST. PATIENT-LVL III: ICD-10-PCS | Mod: PBBFAC,,, | Performed by: NURSE PRACTITIONER

## 2020-08-31 RX ORDER — AZITHROMYCIN 250 MG/1
TABLET, FILM COATED ORAL
Qty: 6 TABLET | Refills: 0 | Status: SHIPPED | OUTPATIENT
Start: 2020-08-31 | End: 2020-09-05

## 2020-08-31 RX ORDER — METHYLPREDNISOLONE ACETATE 80 MG/ML
80 INJECTION, SUSPENSION INTRA-ARTICULAR; INTRALESIONAL; INTRAMUSCULAR; SOFT TISSUE
Status: COMPLETED | OUTPATIENT
Start: 2020-08-31 | End: 2020-08-31

## 2020-08-31 RX ADMIN — METHYLPREDNISOLONE ACETATE 80 MG: 80 INJECTION, SUSPENSION INTRA-ARTICULAR; INTRALESIONAL; INTRAMUSCULAR; SOFT TISSUE at 10:08

## 2020-08-31 NOTE — PROGRESS NOTES
Subjective:       Patient ID: Hari Gillespie is a 20 y.o. male.    Chief Complaint: Sinus Problem (congestion with nasal drip and sore throat - x 2 days )    Patient is known, to me and presents with   Chief Complaint   Patient presents with    Sinus Problem     congestion with nasal drip and sore throat - x 2 days    .  Denies chest pain and shortness of breath.  Patient presents with possible sinus infection which he does get some times. No evidence of covid  HPI  Review of Systems   Constitutional: Negative.    HENT: Positive for congestion, postnasal drip, sinus pressure and sinus pain.    Eyes: Negative.    Respiratory: Negative.    Cardiovascular: Negative.    Skin: Negative.    Hematological: Negative.        Objective:      Physical Exam  Constitutional:       Appearance: Normal appearance. He is obese.   HENT:      Head: Normocephalic and atraumatic.      Right Ear: Tympanic membrane has decreased mobility.      Left Ear: Tympanic membrane has decreased mobility.      Nose: Mucosal edema and congestion present.      Right Sinus: Frontal sinus tenderness present.      Left Sinus: Frontal sinus tenderness present.      Mouth/Throat:      Pharynx: No posterior oropharyngeal erythema.   Eyes:      Extraocular Movements: Extraocular movements intact.      Conjunctiva/sclera: Conjunctivae normal.      Pupils: Pupils are equal, round, and reactive to light.   Neck:      Musculoskeletal: Normal range of motion and neck supple. No muscular tenderness.   Cardiovascular:      Rate and Rhythm: Normal rate and regular rhythm.      Heart sounds: Normal heart sounds. No murmur.   Pulmonary:      Effort: Pulmonary effort is normal.      Breath sounds: Normal breath sounds. No wheezing.   Lymphadenopathy:      Cervical: No cervical adenopathy.   Skin:     General: Skin is warm and dry.      Capillary Refill: Capillary refill takes less than 2 seconds.      Coloration: Skin is not jaundiced or pale.      Findings: No  "bruising, erythema, lesion or rash.   Neurological:      Mental Status: He is alert.         Assessment:       1. Acute bacterial sinusitis        Plan:   Hari was seen today for sinus problem.    Diagnoses and all orders for this visit:    Acute bacterial sinusitis  -     azithromycin (Z-JAKE) 250 MG tablet; Take 2 tablets by mouth on day 1; Take 1 tablet by mouth on days 2-5  -     methylPREDNISolone acetate injection 80 mg    "This note will not be shared with the patient."  Keep hydrated   Take zyrtec as needed  rtc as scheduled  "

## 2021-04-16 ENCOUNTER — PATIENT MESSAGE (OUTPATIENT)
Dept: RESEARCH | Facility: HOSPITAL | Age: 21
End: 2021-04-16

## 2022-02-10 ENCOUNTER — PATIENT MESSAGE (OUTPATIENT)
Dept: ADMINISTRATIVE | Facility: HOSPITAL | Age: 22
End: 2022-02-10
Payer: OTHER GOVERNMENT

## 2022-04-04 ENCOUNTER — PATIENT MESSAGE (OUTPATIENT)
Dept: ADMINISTRATIVE | Facility: HOSPITAL | Age: 22
End: 2022-04-04
Payer: OTHER GOVERNMENT

## 2022-09-13 ENCOUNTER — PATIENT OUTREACH (OUTPATIENT)
Dept: ADMINISTRATIVE | Facility: HOSPITAL | Age: 22
End: 2022-09-13
Payer: OTHER GOVERNMENT